# Patient Record
Sex: MALE | Race: WHITE | Employment: OTHER | URBAN - METROPOLITAN AREA
[De-identification: names, ages, dates, MRNs, and addresses within clinical notes are randomized per-mention and may not be internally consistent; named-entity substitution may affect disease eponyms.]

---

## 2024-02-16 ENCOUNTER — HOSPITAL ENCOUNTER (OUTPATIENT)
Dept: RADIOLOGY | Facility: HOSPITAL | Age: 64
Discharge: HOME | End: 2024-02-16
Payer: MEDICARE

## 2024-02-16 DIAGNOSIS — Z13.6 ENCOUNTER FOR SCREENING FOR CARDIOVASCULAR DISORDERS: ICD-10-CM

## 2024-02-16 PROCEDURE — 75571 CT HRT W/O DYE W/CA TEST: CPT

## 2024-07-08 PROBLEM — H91.93 BILATERAL HEARING LOSS: Status: ACTIVE | Noted: 2024-01-09

## 2024-07-08 PROBLEM — E78.2 MIXED HYPERLIPIDEMIA: Status: ACTIVE | Noted: 2024-01-09

## 2024-07-08 PROBLEM — N18.31 STAGE 3A CHRONIC KIDNEY DISEASE (MULTI): Status: ACTIVE | Noted: 2024-01-09

## 2024-07-08 PROBLEM — I87.2 VENOUS INSUFFICIENCY (CHRONIC) (PERIPHERAL): Status: ACTIVE | Noted: 2024-01-09

## 2024-07-08 PROBLEM — H61.22 IMPACTED CERUMEN OF LEFT EAR: Status: ACTIVE | Noted: 2024-01-09

## 2024-07-08 RX ORDER — IBUPROFEN 200 MG
200 TABLET ORAL EVERY 6 HOURS PRN
COMMUNITY

## 2024-07-08 RX ORDER — TAMSULOSIN HYDROCHLORIDE 0.4 MG/1
0.4 CAPSULE ORAL NIGHTLY
COMMUNITY
Start: 2024-01-23

## 2024-07-08 RX ORDER — AMLODIPINE BESYLATE 5 MG/1
5 TABLET ORAL DAILY
COMMUNITY
Start: 2024-01-09

## 2024-07-08 RX ORDER — LOSARTAN POTASSIUM 25 MG/1
25 TABLET ORAL DAILY
COMMUNITY
Start: 2024-01-09

## 2024-07-11 ENCOUNTER — PATIENT OUTREACH (OUTPATIENT)
Dept: HEMATOLOGY/ONCOLOGY | Facility: HOSPITAL | Age: 64
End: 2024-07-11
Payer: MEDICARE

## 2024-07-11 NOTE — PROGRESS NOTES
07/11/2024 12:15PM Patient called into [facility] for a second opinion on his prostate cancer. He was scheduled with Dr. Haley. In speaking with patient, he stated he was diagnosed by Dr. Zafar who is a surgeon, however, patient interested in hearing radiation options as well. Explained that at this point there would be no role for med/onc in newly diagnosed non-metastatic prostate cancer. He was open to seeing a radiation oncologist as he wanted to hear more about his options. Appointment with Dr. Haley cancelled and he is now scheduled to meet with Dr. Alcantar on 07/15/2024.  All of patient's information is in the system, his images have been requested to be pushed into PACS. He underwent biopsy on 04/17/2024 at [facility], path showed Stepan 7(3+4) disease, PSA at diagnosis was 28.18 drawn on 01/9/2024.  All of his information is in EPIC. Terrence Tineo RN.

## 2024-07-12 ENCOUNTER — TELEPHONE (OUTPATIENT)
Dept: RADIATION ONCOLOGY | Facility: HOSPITAL | Age: 64
End: 2024-07-12
Payer: MEDICARE

## 2024-07-12 NOTE — TELEPHONE ENCOUNTER
Called pt to remind of appointment on 7/15/24 at 1:30 Pt confirmed appointment.     Gabrielle Pittman MA

## 2024-07-15 ENCOUNTER — HOSPITAL ENCOUNTER (OUTPATIENT)
Dept: RADIATION ONCOLOGY | Facility: HOSPITAL | Age: 64
Setting detail: RADIATION/ONCOLOGY SERIES
Discharge: HOME | End: 2024-07-15
Payer: MEDICARE

## 2024-07-15 VITALS
TEMPERATURE: 97.9 F | OXYGEN SATURATION: 95 % | SYSTOLIC BLOOD PRESSURE: 144 MMHG | WEIGHT: 208.9 LBS | HEART RATE: 78 BPM | DIASTOLIC BLOOD PRESSURE: 75 MMHG | RESPIRATION RATE: 18 BRPM

## 2024-07-15 DIAGNOSIS — N18.31 STAGE 3A CHRONIC KIDNEY DISEASE (MULTI): ICD-10-CM

## 2024-07-15 DIAGNOSIS — E78.2 MIXED HYPERLIPIDEMIA: ICD-10-CM

## 2024-07-15 DIAGNOSIS — C61 PROSTATE CANCER (MULTI): Primary | ICD-10-CM

## 2024-07-15 DIAGNOSIS — I15.9 SECONDARY HYPERTENSION: ICD-10-CM

## 2024-07-15 PROCEDURE — 99205 OFFICE O/P NEW HI 60 MIN: CPT | Performed by: RADIOLOGY

## 2024-07-15 PROCEDURE — 99215 OFFICE O/P EST HI 40 MIN: CPT | Mod: GC | Performed by: RADIOLOGY

## 2024-07-15 PROCEDURE — G2211 COMPLEX E/M VISIT ADD ON: HCPCS | Performed by: RADIOLOGY

## 2024-07-15 SDOH — ECONOMIC STABILITY: FOOD INSECURITY: WITHIN THE PAST 12 MONTHS, YOU WORRIED THAT YOUR FOOD WOULD RUN OUT BEFORE YOU GOT MONEY TO BUY MORE.: NEVER TRUE

## 2024-07-15 SDOH — ECONOMIC STABILITY: FOOD INSECURITY: WITHIN THE PAST 12 MONTHS, THE FOOD YOU BOUGHT JUST DIDN'T LAST AND YOU DIDN'T HAVE MONEY TO GET MORE.: NEVER TRUE

## 2024-07-15 ASSESSMENT — ENCOUNTER SYMPTOMS
PSYCHIATRIC NEGATIVE: 1
RESPIRATORY NEGATIVE: 1
LOSS OF SENSATION IN FEET: 0
BACK PAIN: 1
GASTROINTESTINAL NEGATIVE: 1
EYES NEGATIVE: 1
CARDIOVASCULAR NEGATIVE: 1
FATIGUE: 1
NEUROLOGICAL NEGATIVE: 1
ENDOCRINE NEGATIVE: 1
OCCASIONAL FEELINGS OF UNSTEADINESS: 0
DEPRESSION: 0
HEMATOLOGIC/LYMPHATIC NEGATIVE: 1

## 2024-07-15 ASSESSMENT — PATIENT HEALTH QUESTIONNAIRE - PHQ9
2. FEELING DOWN, DEPRESSED OR HOPELESS: NOT AT ALL
SUM OF ALL RESPONSES TO PHQ9 QUESTIONS 1 AND 2: 0
1. LITTLE INTEREST OR PLEASURE IN DOING THINGS: NOT AT ALL

## 2024-07-15 ASSESSMENT — COLUMBIA-SUICIDE SEVERITY RATING SCALE - C-SSRS
2. HAVE YOU ACTUALLY HAD ANY THOUGHTS OF KILLING YOURSELF?: NO
6. HAVE YOU EVER DONE ANYTHING, STARTED TO DO ANYTHING, OR PREPARED TO DO ANYTHING TO END YOUR LIFE?: NO
1. IN THE PAST MONTH, HAVE YOU WISHED YOU WERE DEAD OR WISHED YOU COULD GO TO SLEEP AND NOT WAKE UP?: NO

## 2024-07-15 ASSESSMENT — PAIN SCALES - GENERAL: PAINLEVEL: 0-NO PAIN

## 2024-07-15 NOTE — PROGRESS NOTES
Radiation Oncology Outpatient Consult    Patient Name:  Jennifer Shelley  MRN:  [id number]  :  1960    Referring Provider: No ref. provider found  Primary Care Provider: No primary care provider on file.  Care Team: No care team member to display    Date of Service: 7/15/2024     SUBJECTIVE  History of Present Illness:  Jennifer Shelley is a 64 y.o. male for a consultation to the [facility] Department of Radiation Oncology.  He is presenting for evaluation and management of a newly diagnosed HR prostate ca (GG2, GS 3+4=7,  core, PSA 28.18 on 24). He was seen by Dr. Zafar at [facility]. Bone scan 5/3/2024 shows no suspicious foci of tracer uptake. Now present to [facility] for a consultation of treatment options. Pt reports nocturia. Denies urgency or incontinence. Denies recent body weight loss. Denies fatigue.     Prior Radiotherapy:  No radiation treatments to show. (Treatments may have been administered in another system.)       Past Medical History:    Past Medical History:   Diagnosis Date    Chronic kidney disease     stage 3    Diverticulosis     Hypertension     Prostate cancer (Multi)         Past Surgical History:    Past Surgical History:   Procedure Laterality Date    CHOLECYSTECTOMY  2024        Family History:  Cancer-related family history includes Prostate cancer in his father.    Social History:    Social History     Tobacco Use    Smoking status: Former     Current packs/day: 0.00     Types: Cigarettes     Start date:      Quit date:      Years since quittin.5    Smokeless tobacco: Never   Vaping Use    Vaping status: Never Used   Substance Use Topics    Alcohol use: Not Currently    Drug use: Not Currently       Allergies:    Allergies   Allergen Reactions    Poison Ivy Extract Unknown    Clindamycin Hives, Itching and Rash        Medications:    Current Outpatient Medications:     amLODIPine (Norvasc) 5 mg tablet, Take 1 tablet (5 mg) by mouth once  daily., Disp: , Rfl:     ibuprofen 200 mg tablet, Take 1 tablet (200 mg) by mouth every 6 hours if needed for mild pain (1 - 3) or moderate pain (4 - 6)., Disp: , Rfl:     losartan (Cozaar) 25 mg tablet, Take 1 tablet (25 mg) by mouth once daily., Disp: , Rfl:     tamsulosin (Flomax) 0.4 mg 24 hr capsule, Take 1 capsule (0.4 mg) by mouth once daily at bedtime., Disp: , Rfl:           Performance Status:  The Karnofsky performance scale today is 90, Able to carry on normal activity; minor signs or symptoms of disease (ECOG equivalent 0).        OBJECTIVE  /75   Pulse 78   Temp 36.6 °C (97.9 °F) (Skin)   Resp 18   Wt 94.8 kg (208 lb 14.4 oz)   SpO2 95%    Physical Exam  Vitals reviewed.   Constitutional:       General: He is not in acute distress.     Appearance: Normal appearance.   HENT:      Head: Normocephalic and atraumatic.      Nose: Nose normal. No rhinorrhea.      Mouth/Throat:      Mouth: Mucous membranes are moist.   Eyes:      General: No scleral icterus.     Conjunctiva/sclera: Conjunctivae normal.   Neck:      Trachea: No tracheostomy.   Cardiovascular:      Pulses: Normal pulses.      Heart sounds: Normal heart sounds.   Pulmonary:      Effort: Pulmonary effort is normal. No respiratory distress.   Abdominal:      General: Abdomen is flat.      Palpations: Abdomen is soft. There is no mass.   Musculoskeletal:         General: No deformity or signs of injury. Normal range of motion.      Cervical back: Normal range of motion.      Right lower leg: No edema.      Left lower leg: No edema.   Lymphadenopathy:      Cervical: No cervical adenopathy.   Skin:     General: Skin is warm and dry.      Coloration: Skin is not jaundiced.      Findings: No bruising.   Neurological:      General: No focal deficit present.      Mental Status: He is alert and oriented to person, place, and time.      Cranial Nerves: No cranial nerve deficit.      Coordination: Coordination normal.      Gait: Gait normal.    Psychiatric:         Mood and Affect: Mood normal.         Behavior: Behavior normal.         Thought Content: Thought content normal.         Judgment: Judgment normal.          Laboratory Review:  There are no laboratory contraindications to radiation therapy.    The pertinent lab results were reviewed and discussed with the patient.  Notably,     PSA   1/9/2024 28.18     Total prostate volume was 153gm.    Pathology Review:  The pertinent pathology results were reviewed and discussed with the patient.  Notably,   4/17/2024     FINAL DIAGNOSIS    A.  Prostate, left base, biopsy:  - Prostatic adenocarcinoma, Quincy score 3+4=7 (Grade Group 2), involving 1 of 2 cores (45%, 7.5 mm).  - Percentage of Stepan pattern 4 = 10%.     B.  Prostate, left mid, biopsy:  - Prostatic adenocarcinoma, Quincy score 3+4=7 (Grade Group 2), involving 1 of 2 cores (45%, 8 mm).  - Percentage of Stepan pattern 4 = 5%.     C.  Prostate, left apex, biopsy:  - Prostatic adenocarcinoma, Quincy score 3+4=7 (Grade Group 2), involving 1 of 2 cores (15%, 3 mm).  - Percentage of Quincy pattern 4 = 5%.     D.  Prostate, right base, biopsy:  - High-grade prostatic intraepithelial neoplasia.     E.  Prostate, right mid, biopsy:  - Benign prostatic tissue.     F.  Prostate, right apex, biopsy:  - Small focus of atypical glands (atypical small acinar proliferation).     G.  Prostate, T1 left mid posteromedial peripheral zone, biopsy:  - Prostatic adenocarcinoma, Quincy score 3+4=7 (Grade Group 2), involving 2 of 2 cores (55%, 9 mm; 25%, 4 mm).  - Percentage of Stepan pattern 4 = 10%.     Prostate Cancer Biopsy Summary     Number of cores examined: 14  Number of cores positive: 5  Highest Grade Group: 2  Highest % of core involvement: 55% (9 mm)  Unfavorable histology: Absent  Borderline histology: Present  Large cribriform pattern 4: Absent  Intraductal carcinoma: Absent  Block for additional biomarkers/molecular studies: A1        Imaging:   The pertinent imaging results were reviewed and discussed with the patient.  Notably,     3/20/2024 MRI prostate      MRI PROSTATE WO/W IVCON  Order: [id number]  Impression    IMPRESSION:    2.1 cm left mid posterior peripheral zone PI-RADS 5 lesion with suspected  extra prostatic extension and equivocal neurovascular bundle involvement.   No involvement of seminal vesicles or adjacent organs.    Enlarged inguinal lymph nodes bilaterally, one enlarged right external  iliac lymph node and several prominent but not enlarged intrapelvic lymph  nodes.  Pattern of enlarged lymph nodes would be somewhat atypical for  metastatic prostate cancer although this is possible.    Lymphoproliferative disorders also possible.    No findings suspicious for osseous metastasis.      ==========================================  Number of targets created for MR/US fusion biopsy:  Peripheral zone: 1  Transition zone: 0    If present, targets were numbered in order of level of suspicion for  clinically significant prostate cancer (Stepan score 3 + 4 or higher).    PI-RADS v2.1 Assessment Categories:  PI-RADS 1: Clinically significant cancer is highly unlikely  PI-RADS 2: Clinically significant cancer is unlikely  PI-RADS 3: Clinically significant cancer is equivocal  PI-RADS 4: Clinically significant cancer is likely  PI-RADS 5: Clinically significant cancer is highly likely                Transcribed Using Voice Recognition  Transcribe Date/Time: Mar 20 2024  3:01P    Dictated by: LALI ESPINOZA MD    This examination was interpreted and the report reviewed and  electronically signed by:  LALI ESPINOZA MD on Mar 20 2024  3:22PM  EST    5/3/2024 Bone scan     NM PET CT whole body  Order: [id number]  Impression    IMPRESSION:    No evidence of osteoblastic metastases.        Transcribed Using Voice Recognition  Transcribe Date/Time: May  3 2024  7:29P    Dictated by: MADISON SANDHU MD    This examination was interpreted and the report  reviewed and  electronically signed by:  MADISON SANDHU MD on May  3 2024  7:30PM  EST  Narrative    * * *Final Report* * *    DATE OF EXAM: May  3 2024  2:43PM      HCN   [id number]  -  NM BONE WHOLE BODY  / ACCESSION #  [id number]    PROCEDURE REASON: Prostate cancer (HCC)        * * * * Physician Interpretation * * * *    RESULT: EXAM: WHOLE BODY BONE SCAN    HISTORY: Prostate cancer (HCC)    TECHNIQUE: 24 millicuries of Tc-99m MDP administered IV.  Whole body  anterior and posterior planar images obtained 3-4 hours.  Static images  of the head and pelvis also obtained.    COMPARISON: No prior bone scan available    CORRELATION: None available    RESULTS:    Bones:  *  No suspicious foci of tracer uptake.  *  Mildly increased tracer uptake in the spine, knees, ankles and feet is  likely degenerative.    Kidneys/Bladder: Physiologic activity present.    Other findings: None.  Exam End: 05/03/24 14:43    Specimen Collected: 05/03/24 14:43 Last Resulted: 05/03/24 19:32   Received From: [facility]  Result Received: 07/10/24 07:5       ASSESSMENT:   Jennifer Shelley is a 64 y.o. male with a newly diagnosed HR prostate ca (GG2, GS 3+4=7, 5/14 core positivity, PSA 28.18, no bone mets)    We discussed about localized vs. metastatic prostate ca. The bone scan he had only shows reactive activity to bone, not prostate specific. PSMA PET CT will be a more specific way to detect metastasis from prostate ca if there is any. If there is no metastasis, he will fall into at least unfavorable intermediate-risk, and likely high-risk risk, given high PSA>10, prostate cancer based on his clinical-pathologic factors. He is not in any pain.    We discussed that treatment options include surgery, with an increased likelihood of requiring salvage RT if he has pT3b disease, or definitive external beam radiation therapy (EBRT) with 6-18 month course of hormone therapy.  We discussed various EBRT treatment regimens, including moderate  hypofractionation over 20 treatments, and ultra hypofractionation over 5 treatments. His IPSS is 8.    We discussed the logistics of radiation, including placement of fiducials into the prostate for improved and spacer gel to reduce radiation dose to the rectum.  Logistics of radiation therapy including CT-based simulation and bowel and bladder preparation were discussed, as well as risks and benefits of radiation therapy with a detailed discussion of possible short- and long-term side effects including fatigue, toxicities to the bladder, urethra, and rectum, sexual dysfunction, infertility and secondary malignancy in the radiation field.     Potential side effects from hormonal suppression therapy were discussed, which include but are not limited to sexual dysfunction, increased risk of osteoporosis and bone fractures, vasomotor 'hot flashes', weight gain and body composition changes, elevated cholesterol and lipoprotein levels, increased risk for diabetes mellitus/hyperglycemia, cardiovascular disease, fatigue, anemia, gynecomastia, cognitive decline, and decrease in penis and/or testicle size.  The severity and/or permanence of such side effects are expected to be related to the duration of suppression. We encouraged him to exercise to help offset/reduce many of these symptoms, including bone density, weight changes, and muscle mass. Educated on daily Calcium and Vitamin D supplementation.     We discussed 18m of ADT in high-risk disease. Pt will favor 6m shot instead of pills. We discussed about the side effects of hot flashes, decreased of libido, and muscle waste.      Mr. Shelley asked a number of excellent questions that demonstrated good understanding, and would like to proceed with PSMA PET CT, and decide on radiation therapy.    PLAN:  - PSMA PET CT   - Will decide on radiation therapy pending PSMA result. Pt prefers to be treated at [facility] site. Plan is SBRT+18m Lupron injection.     Josette Bah MD  PhD  PGY-2, Radiation Oncology     Agree with note and plan. Ata Alcantar MD

## 2024-07-15 NOTE — PROGRESS NOTES
Radiation Oncology Nursing Note    IPSS (International Prostate Symptom Score):   8 / 35, bother 3   - He is experiencing urinary incontinence.   Flomax   - He is not experiencing dysuria, hematuria, flank pain.     Sexual function:   - Quality of erections during the last 4 weeks: 3 = Firm enough for masturbation and foreplay only  - Use of erectile dysfunction medications:  None    Bowel function:    - Denies hematochezia or pain.    - He does not have a history of hemorrhoids.    - He does have a history of inflammatory bowel disease (Diverticulosis).    Prior Radiotherapy:  No  Radiation Treatments       No radiation treatments to show. (Treatments may have been administered in another system.)          Current Systemic Treatment:  No     Presence of Pacemaker or ICD:  No    History of Autoimmune or Connective Tissue Disorders:  No    Pain: The patient's current pain level was assessed.  They report currently having a pain of 0 out of 10.  They feel their pain is under control without the use of pain medications.    Review of Systems:  Review of Systems   Constitutional:  Positive for fatigue (recovering from recent cholecystectomy).   HENT:  Negative.     Eyes: Negative.    Respiratory: Negative.     Cardiovascular: Negative.    Gastrointestinal: Negative.    Endocrine: Negative.    Genitourinary:  Positive for nocturia (twice a night).         Urgency more than half the time, weak stream in the morning   Musculoskeletal:  Positive for back pain (siatica).   Skin: Negative.    Neurological: Negative.    Hematological: Negative.    Psychiatric/Behavioral: Negative.

## 2024-07-18 ENCOUNTER — APPOINTMENT (OUTPATIENT)
Dept: HEMATOLOGY/ONCOLOGY | Facility: CLINIC | Age: 64
End: 2024-07-18
Payer: MEDICARE

## 2024-07-30 ENCOUNTER — HOSPITAL ENCOUNTER (OUTPATIENT)
Dept: RADIOLOGY | Facility: HOSPITAL | Age: 64
Discharge: HOME | End: 2024-07-30
Payer: MEDICARE

## 2024-07-30 DIAGNOSIS — C61 PROSTATE CANCER (MULTI): ICD-10-CM

## 2024-07-30 PROCEDURE — 78815 PET IMAGE W/CT SKULL-THIGH: CPT | Mod: PI

## 2024-07-30 PROCEDURE — 78815 PET IMAGE W/CT SKULL-THIGH: CPT | Mod: PET TUMOR INIT TX STRAT | Performed by: STUDENT IN AN ORGANIZED HEALTH CARE EDUCATION/TRAINING PROGRAM

## 2024-07-30 PROCEDURE — A9800 HC RX 343 DIAGNOSTIC RADIOPHARMACEUTICALS: HCPCS | Mod: TB

## 2024-07-30 PROCEDURE — 3430000001 HC RX 343 DIAGNOSTIC RADIOPHARMACEUTICALS: Mod: TB

## 2024-10-28 ENCOUNTER — HOSPITAL ENCOUNTER (OUTPATIENT)
Dept: RADIATION ONCOLOGY | Facility: HOSPITAL | Age: 64
Setting detail: RADIATION/ONCOLOGY SERIES
Discharge: HOME | End: 2024-10-28
Payer: MEDICARE

## 2024-10-28 VITALS
RESPIRATION RATE: 18 BRPM | HEIGHT: 68 IN | DIASTOLIC BLOOD PRESSURE: 80 MMHG | BODY MASS INDEX: 32.74 KG/M2 | OXYGEN SATURATION: 95 % | TEMPERATURE: 97.7 F | WEIGHT: 216 LBS | HEART RATE: 55 BPM | SYSTOLIC BLOOD PRESSURE: 148 MMHG

## 2024-10-28 DIAGNOSIS — I87.2 VENOUS INSUFFICIENCY (CHRONIC) (PERIPHERAL): ICD-10-CM

## 2024-10-28 DIAGNOSIS — I15.9 SECONDARY HYPERTENSION: ICD-10-CM

## 2024-10-28 DIAGNOSIS — C61 PROSTATE CANCER (MULTI): Primary | ICD-10-CM

## 2024-10-28 DIAGNOSIS — C61 MALIGNANT NEOPLASM OF PROSTATE (MULTI): ICD-10-CM

## 2024-10-28 DIAGNOSIS — E78.2 MIXED HYPERLIPIDEMIA: ICD-10-CM

## 2024-10-28 DIAGNOSIS — N18.31 STAGE 3A CHRONIC KIDNEY DISEASE (MULTI): ICD-10-CM

## 2024-10-28 PROCEDURE — 99215 OFFICE O/P EST HI 40 MIN: CPT | Performed by: RADIOLOGY

## 2024-10-28 PROCEDURE — G2211 COMPLEX E/M VISIT ADD ON: HCPCS | Performed by: RADIOLOGY

## 2024-10-28 SDOH — ECONOMIC STABILITY: FOOD INSECURITY: WITHIN THE PAST 12 MONTHS, YOU WORRIED THAT YOUR FOOD WOULD RUN OUT BEFORE YOU GOT MONEY TO BUY MORE.: NEVER TRUE

## 2024-10-28 SDOH — ECONOMIC STABILITY: FOOD INSECURITY: WITHIN THE PAST 12 MONTHS, THE FOOD YOU BOUGHT JUST DIDN'T LAST AND YOU DIDN'T HAVE MONEY TO GET MORE.: NEVER TRUE

## 2024-10-28 ASSESSMENT — ENCOUNTER SYMPTOMS
DEPRESSION: 0
DIARRHEA: 1
FATIGUE: 1
PSYCHIATRIC NEGATIVE: 1
CARDIOVASCULAR NEGATIVE: 1
DIFFICULTY URINATING: 1
BACK PAIN: 1
OCCASIONAL FEELINGS OF UNSTEADINESS: 0
RESPIRATORY NEGATIVE: 1
LOSS OF SENSATION IN FEET: 0
EYES NEGATIVE: 1
NEUROLOGICAL NEGATIVE: 1
HEMATOLOGIC/LYMPHATIC NEGATIVE: 1

## 2024-10-28 ASSESSMENT — COLUMBIA-SUICIDE SEVERITY RATING SCALE - C-SSRS
2. HAVE YOU ACTUALLY HAD ANY THOUGHTS OF KILLING YOURSELF?: NO
1. IN THE PAST MONTH, HAVE YOU WISHED YOU WERE DEAD OR WISHED YOU COULD GO TO SLEEP AND NOT WAKE UP?: NO
6. HAVE YOU EVER DONE ANYTHING, STARTED TO DO ANYTHING, OR PREPARED TO DO ANYTHING TO END YOUR LIFE?: NO

## 2024-10-28 ASSESSMENT — PATIENT HEALTH QUESTIONNAIRE - PHQ9
SUM OF ALL RESPONSES TO PHQ9 QUESTIONS 1 AND 2: 0
2. FEELING DOWN, DEPRESSED OR HOPELESS: NOT AT ALL
1. LITTLE INTEREST OR PLEASURE IN DOING THINGS: NOT AT ALL

## 2024-10-28 ASSESSMENT — PAIN SCALES - GENERAL: PAINLEVEL_OUTOF10: 0-NO PAIN

## 2024-10-29 ENCOUNTER — PREP FOR PROCEDURE (OUTPATIENT)
Dept: UROLOGY | Facility: HOSPITAL | Age: 64
End: 2024-10-29
Payer: MEDICARE

## 2024-10-29 DIAGNOSIS — C61 MALIGNANT NEOPLASM OF PROSTATE (MULTI): ICD-10-CM

## 2024-10-29 RX ORDER — CEFAZOLIN SODIUM 2 G/100ML
2 INJECTION, SOLUTION INTRAVENOUS ONCE
OUTPATIENT
Start: 2024-10-29 | End: 2024-10-29

## 2024-10-31 DIAGNOSIS — C61 MALIGNANT NEOPLASM OF PROSTATE (MULTI): Primary | ICD-10-CM

## 2024-10-31 RX ORDER — DIPHENHYDRAMINE HYDROCHLORIDE 50 MG/ML
50 INJECTION INTRAMUSCULAR; INTRAVENOUS AS NEEDED
OUTPATIENT
Start: 2025-01-13

## 2024-10-31 RX ORDER — ALBUTEROL SULFATE 0.83 MG/ML
3 SOLUTION RESPIRATORY (INHALATION) AS NEEDED
OUTPATIENT
Start: 2025-01-13

## 2024-10-31 RX ORDER — EPINEPHRINE 0.3 MG/.3ML
0.3 INJECTION SUBCUTANEOUS EVERY 5 MIN PRN
OUTPATIENT
Start: 2025-01-13

## 2024-10-31 RX ORDER — FAMOTIDINE 10 MG/ML
20 INJECTION INTRAVENOUS ONCE AS NEEDED
OUTPATIENT
Start: 2025-01-13

## 2025-01-07 DIAGNOSIS — C61 PROSTATE CANCER (MULTI): Primary | ICD-10-CM

## 2025-01-13 ENCOUNTER — INFUSION (OUTPATIENT)
Dept: HEMATOLOGY/ONCOLOGY | Facility: HOSPITAL | Age: 65
End: 2025-01-13
Payer: MEDICARE

## 2025-01-13 ENCOUNTER — APPOINTMENT (OUTPATIENT)
Dept: RADIATION ONCOLOGY | Facility: HOSPITAL | Age: 65
End: 2025-01-13
Payer: MEDICARE

## 2025-01-13 VITALS
OXYGEN SATURATION: 97 % | DIASTOLIC BLOOD PRESSURE: 79 MMHG | RESPIRATION RATE: 19 BRPM | HEART RATE: 84 BPM | BODY MASS INDEX: 32.88 KG/M2 | SYSTOLIC BLOOD PRESSURE: 153 MMHG | WEIGHT: 216.27 LBS | TEMPERATURE: 98.2 F

## 2025-01-13 DIAGNOSIS — C61 MALIGNANT NEOPLASM OF PROSTATE (MULTI): ICD-10-CM

## 2025-01-13 PROCEDURE — 96402 CHEMO HORMON ANTINEOPL SQ/IM: CPT

## 2025-01-13 PROCEDURE — 2500000004 HC RX 250 GENERAL PHARMACY W/ HCPCS (ALT 636 FOR OP/ED): Mod: JZ,JG,TB

## 2025-01-13 RX ORDER — EPINEPHRINE 0.3 MG/.3ML
0.3 INJECTION SUBCUTANEOUS EVERY 5 MIN PRN
OUTPATIENT
Start: 2025-04-07

## 2025-01-13 RX ORDER — DIPHENHYDRAMINE HYDROCHLORIDE 50 MG/ML
50 INJECTION INTRAMUSCULAR; INTRAVENOUS AS NEEDED
Status: DISCONTINUED | OUTPATIENT
Start: 2025-01-13 | End: 2025-01-13 | Stop reason: HOSPADM

## 2025-01-13 RX ORDER — FAMOTIDINE 10 MG/ML
20 INJECTION INTRAVENOUS ONCE AS NEEDED
OUTPATIENT
Start: 2025-04-07

## 2025-01-13 RX ORDER — EPINEPHRINE 0.3 MG/.3ML
0.3 INJECTION SUBCUTANEOUS EVERY 5 MIN PRN
Status: DISCONTINUED | OUTPATIENT
Start: 2025-01-13 | End: 2025-01-13 | Stop reason: HOSPADM

## 2025-01-13 RX ORDER — FAMOTIDINE 10 MG/ML
20 INJECTION INTRAVENOUS ONCE AS NEEDED
Status: DISCONTINUED | OUTPATIENT
Start: 2025-01-13 | End: 2025-01-13 | Stop reason: HOSPADM

## 2025-01-13 RX ORDER — ALBUTEROL SULFATE 0.83 MG/ML
3 SOLUTION RESPIRATORY (INHALATION) AS NEEDED
Status: DISCONTINUED | OUTPATIENT
Start: 2025-01-13 | End: 2025-01-13 | Stop reason: HOSPADM

## 2025-01-13 RX ORDER — ALBUTEROL SULFATE 0.83 MG/ML
3 SOLUTION RESPIRATORY (INHALATION) AS NEEDED
OUTPATIENT
Start: 2025-04-07

## 2025-01-13 RX ORDER — DIPHENHYDRAMINE HYDROCHLORIDE 50 MG/ML
50 INJECTION INTRAMUSCULAR; INTRAVENOUS AS NEEDED
OUTPATIENT
Start: 2025-04-07

## 2025-01-13 RX ADMIN — LEUPROLIDE ACETATE 22.5 MG: KIT at 11:24

## 2025-01-13 ASSESSMENT — PAIN SCALES - GENERAL: PAINLEVEL_OUTOF10: 0-NO PAIN

## 2025-01-15 ENCOUNTER — ANESTHESIA EVENT (OUTPATIENT)
Dept: OPERATING ROOM | Facility: HOSPITAL | Age: 65
End: 2025-01-15
Payer: MEDICARE

## 2025-01-15 ENCOUNTER — ANESTHESIA (OUTPATIENT)
Dept: OPERATING ROOM | Facility: HOSPITAL | Age: 65
End: 2025-01-15
Payer: MEDICARE

## 2025-01-15 ENCOUNTER — HOSPITAL ENCOUNTER (OUTPATIENT)
Facility: HOSPITAL | Age: 65
Setting detail: OUTPATIENT SURGERY
Discharge: HOME | End: 2025-01-15
Attending: STUDENT IN AN ORGANIZED HEALTH CARE EDUCATION/TRAINING PROGRAM | Admitting: STUDENT IN AN ORGANIZED HEALTH CARE EDUCATION/TRAINING PROGRAM
Payer: MEDICARE

## 2025-01-15 VITALS
DIASTOLIC BLOOD PRESSURE: 81 MMHG | BODY MASS INDEX: 32.78 KG/M2 | TEMPERATURE: 98.6 F | SYSTOLIC BLOOD PRESSURE: 141 MMHG | HEIGHT: 68 IN | OXYGEN SATURATION: 96 % | HEART RATE: 68 BPM | RESPIRATION RATE: 19 BRPM | WEIGHT: 216.27 LBS

## 2025-01-15 DIAGNOSIS — C61 MALIGNANT NEOPLASM OF PROSTATE (MULTI): Primary | ICD-10-CM

## 2025-01-15 PROCEDURE — A55874 PR TRANSPERINEAL PLACEMENT OF BIODEGRADABLE MATERIAL, PERI-PROSTATIC, SINGLE OR MULTIPLE: Performed by: ANESTHESIOLOGIST ASSISTANT

## 2025-01-15 PROCEDURE — 3700000002 HC GENERAL ANESTHESIA TIME - EACH INCREMENTAL 1 MINUTE: Performed by: STUDENT IN AN ORGANIZED HEALTH CARE EDUCATION/TRAINING PROGRAM

## 2025-01-15 PROCEDURE — 7100000010 HC PHASE TWO TIME - EACH INCREMENTAL 1 MINUTE: Performed by: STUDENT IN AN ORGANIZED HEALTH CARE EDUCATION/TRAINING PROGRAM

## 2025-01-15 PROCEDURE — 3700000001 HC GENERAL ANESTHESIA TIME - INITIAL BASE CHARGE: Performed by: STUDENT IN AN ORGANIZED HEALTH CARE EDUCATION/TRAINING PROGRAM

## 2025-01-15 PROCEDURE — 7100000009 HC PHASE TWO TIME - INITIAL BASE CHARGE: Performed by: STUDENT IN AN ORGANIZED HEALTH CARE EDUCATION/TRAINING PROGRAM

## 2025-01-15 PROCEDURE — 7100000001 HC RECOVERY ROOM TIME - INITIAL BASE CHARGE: Performed by: STUDENT IN AN ORGANIZED HEALTH CARE EDUCATION/TRAINING PROGRAM

## 2025-01-15 PROCEDURE — 2780000003 HC OR 278 NO HCPCS: Performed by: STUDENT IN AN ORGANIZED HEALTH CARE EDUCATION/TRAINING PROGRAM

## 2025-01-15 PROCEDURE — 2500000004 HC RX 250 GENERAL PHARMACY W/ HCPCS (ALT 636 FOR OP/ED): Performed by: STUDENT IN AN ORGANIZED HEALTH CARE EDUCATION/TRAINING PROGRAM

## 2025-01-15 PROCEDURE — 55876 PLACE RT DEVICE/MARKER PROS: CPT | Performed by: STUDENT IN AN ORGANIZED HEALTH CARE EDUCATION/TRAINING PROGRAM

## 2025-01-15 PROCEDURE — 55874 TPRNL PLMT BIODEGRDABL MATRL: CPT | Performed by: STUDENT IN AN ORGANIZED HEALTH CARE EDUCATION/TRAINING PROGRAM

## 2025-01-15 PROCEDURE — 2500000004 HC RX 250 GENERAL PHARMACY W/ HCPCS (ALT 636 FOR OP/ED): Performed by: ANESTHESIOLOGIST ASSISTANT

## 2025-01-15 PROCEDURE — 2720000007 HC OR 272 NO HCPCS: Performed by: STUDENT IN AN ORGANIZED HEALTH CARE EDUCATION/TRAINING PROGRAM

## 2025-01-15 PROCEDURE — C1889 IMPLANT/INSERT DEVICE, NOC: HCPCS | Performed by: STUDENT IN AN ORGANIZED HEALTH CARE EDUCATION/TRAINING PROGRAM

## 2025-01-15 PROCEDURE — 76872 US TRANSRECTAL: CPT | Performed by: STUDENT IN AN ORGANIZED HEALTH CARE EDUCATION/TRAINING PROGRAM

## 2025-01-15 PROCEDURE — 3600000009 HC OR TIME - EACH INCREMENTAL 1 MINUTE - PROCEDURE LEVEL FOUR: Performed by: STUDENT IN AN ORGANIZED HEALTH CARE EDUCATION/TRAINING PROGRAM

## 2025-01-15 PROCEDURE — A55874 PR TRANSPERINEAL PLACEMENT OF BIODEGRADABLE MATERIAL, PERI-PROSTATIC, SINGLE OR MULTIPLE: Performed by: ANESTHESIOLOGY

## 2025-01-15 PROCEDURE — 7100000002 HC RECOVERY ROOM TIME - EACH INCREMENTAL 1 MINUTE: Performed by: STUDENT IN AN ORGANIZED HEALTH CARE EDUCATION/TRAINING PROGRAM

## 2025-01-15 PROCEDURE — 3600000004 HC OR TIME - INITIAL BASE CHARGE - PROCEDURE LEVEL FOUR: Performed by: STUDENT IN AN ORGANIZED HEALTH CARE EDUCATION/TRAINING PROGRAM

## 2025-01-15 DEVICE — STERILE PLACEMENT NEEDLES (17GA ETW X 20CM) WITH BONE WAX AND (1.2 X 3MM) SOFT TISSUE GOLD MARKER [3]
Type: IMPLANTABLE DEVICE | Site: PROSTATE | Status: FUNCTIONAL
Brand: FIDUCIAL MARKER KIT

## 2025-01-15 RX ORDER — ACETAMINOPHEN 325 MG/1
650 TABLET ORAL EVERY 4 HOURS PRN
Status: DISCONTINUED | OUTPATIENT
Start: 2025-01-15 | End: 2025-01-15 | Stop reason: HOSPADM

## 2025-01-15 RX ORDER — ONDANSETRON HYDROCHLORIDE 2 MG/ML
4 INJECTION, SOLUTION INTRAVENOUS ONCE AS NEEDED
Status: DISCONTINUED | OUTPATIENT
Start: 2025-01-15 | End: 2025-01-15 | Stop reason: HOSPADM

## 2025-01-15 RX ORDER — FENTANYL CITRATE 50 UG/ML
INJECTION, SOLUTION INTRAMUSCULAR; INTRAVENOUS AS NEEDED
Status: DISCONTINUED | OUTPATIENT
Start: 2025-01-15 | End: 2025-01-15

## 2025-01-15 RX ORDER — OXYCODONE HYDROCHLORIDE 5 MG/1
5 TABLET ORAL EVERY 4 HOURS PRN
Status: DISCONTINUED | OUTPATIENT
Start: 2025-01-15 | End: 2025-01-15 | Stop reason: HOSPADM

## 2025-01-15 RX ORDER — LIDOCAINE HYDROCHLORIDE 20 MG/ML
INJECTION, SOLUTION INFILTRATION; PERINEURAL AS NEEDED
Status: DISCONTINUED | OUTPATIENT
Start: 2025-01-15 | End: 2025-01-15

## 2025-01-15 RX ORDER — MIDAZOLAM HYDROCHLORIDE 1 MG/ML
INJECTION INTRAMUSCULAR; INTRAVENOUS AS NEEDED
Status: DISCONTINUED | OUTPATIENT
Start: 2025-01-15 | End: 2025-01-15

## 2025-01-15 RX ORDER — CEFAZOLIN SODIUM 2 G/100ML
2 INJECTION, SOLUTION INTRAVENOUS ONCE
Status: DISCONTINUED | OUTPATIENT
Start: 2025-01-15 | End: 2025-01-15 | Stop reason: HOSPADM

## 2025-01-15 RX ORDER — ALBUTEROL SULFATE 0.83 MG/ML
2.5 SOLUTION RESPIRATORY (INHALATION) ONCE AS NEEDED
Status: DISCONTINUED | OUTPATIENT
Start: 2025-01-15 | End: 2025-01-15 | Stop reason: HOSPADM

## 2025-01-15 RX ORDER — PROPOFOL 10 MG/ML
INJECTION, EMULSION INTRAVENOUS AS NEEDED
Status: DISCONTINUED | OUTPATIENT
Start: 2025-01-15 | End: 2025-01-15

## 2025-01-15 RX ORDER — CEFAZOLIN 1 G/1
INJECTION, POWDER, FOR SOLUTION INTRAVENOUS AS NEEDED
Status: DISCONTINUED | OUTPATIENT
Start: 2025-01-15 | End: 2025-01-15

## 2025-01-15 RX ADMIN — FENTANYL CITRATE 50 MCG: 50 INJECTION, SOLUTION INTRAMUSCULAR; INTRAVENOUS at 09:43

## 2025-01-15 RX ADMIN — PROPOFOL 50 MG: 10 INJECTION, EMULSION INTRAVENOUS at 09:43

## 2025-01-15 RX ADMIN — MIDAZOLAM HYDROCHLORIDE 2 MG: 1 INJECTION, SOLUTION INTRAMUSCULAR; INTRAVENOUS at 09:36

## 2025-01-15 RX ADMIN — CEFAZOLIN 2 G: 330 INJECTION, POWDER, FOR SOLUTION INTRAMUSCULAR; INTRAVENOUS at 09:44

## 2025-01-15 RX ADMIN — PROPOFOL 200 MCG/KG/MIN: 10 INJECTION, EMULSION INTRAVENOUS at 09:44

## 2025-01-15 RX ADMIN — LIDOCAINE HYDROCHLORIDE 50 MG: 20 INJECTION, SOLUTION INFILTRATION; PERINEURAL at 09:43

## 2025-01-15 ASSESSMENT — PAIN SCALES - GENERAL
PAINLEVEL_OUTOF10: 0 - NO PAIN

## 2025-01-15 ASSESSMENT — ENCOUNTER SYMPTOMS
NEUROLOGICAL NEGATIVE: 1
CARDIOVASCULAR NEGATIVE: 1
GASTROINTESTINAL NEGATIVE: 1
HEMATOLOGIC/LYMPHATIC NEGATIVE: 1
MUSCULOSKELETAL NEGATIVE: 1
CONSTITUTIONAL NEGATIVE: 1
PSYCHIATRIC NEGATIVE: 1
ENDOCRINE NEGATIVE: 1
EYES NEGATIVE: 1
RESPIRATORY NEGATIVE: 1

## 2025-01-15 ASSESSMENT — PAIN - FUNCTIONAL ASSESSMENT
PAIN_FUNCTIONAL_ASSESSMENT: UNABLE TO SELF-REPORT
PAIN_FUNCTIONAL_ASSESSMENT: 0-10
PAIN_FUNCTIONAL_ASSESSMENT: UNABLE TO SELF-REPORT

## 2025-01-15 NOTE — ANESTHESIA POSTPROCEDURE EVALUATION
Patient: Jennifer Shelley    Procedure Summary       Date: 01/15/25 Room / Location: [facility] OR 18 / Virtual [facility] OR    Anesthesia Start: 0936 Anesthesia Stop: 1002    Procedure: Insertion Fiducial Marker Prostate ~ SpaceOAR (Prostate) Diagnosis:       Malignant neoplasm of prostate (Multi)      (Malignant neoplasm of prostate (Multi) [C61])    Surgeons: Alonso Newell MD Responsible Provider: Mario Waldron MD    Anesthesia Type: MAC ASA Status: 3            Anesthesia Type: MAC    Vitals Value Taken Time   /73 01/15/25 1031   Temp 37 °C (98.6 °F) 01/15/25 0959   Pulse 73 01/15/25 1037   Resp 22 01/15/25 1037   SpO2 95 % 01/15/25 1042   Vitals shown include unfiled device data.    Anesthesia Post Evaluation    Patient location during evaluation: PACU  Patient participation: complete - patient participated  Level of consciousness: awake and alert  Pain management: adequate  Airway patency: patent  Cardiovascular status: acceptable and hemodynamically stable  Respiratory status: acceptable, spontaneous ventilation and nonlabored ventilation  Hydration status: acceptable  Postoperative Nausea and Vomiting: none        There were no known notable events for this encounter.

## 2025-01-15 NOTE — ANESTHESIA PREPROCEDURE EVALUATION
"Patient: Jennifer Shelley    Procedure Information       Date/Time: 01/15/25 0900    Procedure: Insertion Fiducial Marker Prostate ~ SpaceOAR (Prostate)    Location: [facility] OR 18 / Virtual [facility] OR    Surgeons: Alonso Newell MD            Relevant Problems   Cardiac   (+) HTN (hypertension)   (+) Mixed hyperlipidemia      /Renal   (+) Malignant neoplasm of prostate (Multi)   (+) Prostate cancer (Multi)      HEENT   (+) Bilateral hearing loss       Clinical information reviewed:    Allergies  Meds                Past Medical History:   Diagnosis Date    Chronic kidney disease     stage 3    Diverticulosis     Gout     Hypertension     Prostate cancer (Multi)       Past Surgical History:   Procedure Laterality Date    CHOLECYSTECTOMY  2024    Subtotal barbara, umbilical hernia repair     Social History     Tobacco Use    Smoking status: Former     Current packs/day: 0.00     Average packs/day: 0.5 packs/day for 15.0 years (7.5 ttl pk-yrs)     Types: Cigarettes     Start date: 1976     Quit date:      Years since quittin.0    Smokeless tobacco: Never   Vaping Use    Vaping status: Never Used   Substance Use Topics    Alcohol use: Yes     Alcohol/week: 4.0 standard drinks of alcohol     Types: 2 Cans of beer, 2 Shots of liquor per week    Drug use: Not Currently      Current Outpatient Medications   Medication Instructions    amLODIPine (NORVASC) 5 mg, Daily    ibuprofen 200 mg, Every 6 hours PRN    losartan (COZAAR) 25 mg, Daily    tamsulosin (FLOMAX) 0.4 mg, Nightly      Allergies   Allergen Reactions    Poison Ivy Extract Unknown    Clindamycin Hives, Itching and Rash        Chemistry    No results found for: \"NA\", \"K\", \"CL\", \"CO2\", \"BUN\", \"CREATININE\", \"GLU\" No results found for: \"CALCIUM\", \"ALKPHOS\", \"AST\", \"ALT\", \"BILITOT\"       Lab Results   Component Value Date    HGBA1C 5.7 (H) 2024     No results found for: \"WBC\", \"HGB\", \"HCT\", \"PLT\"  No results found for: \"PROTIME\", \"PTT\", \"INR\"  No " "results found for: \"ABORH\"  No results found for this or any previous visit (from the past 4464 hours).  No results found for this or any previous visit from the past 1095 days.       Visit Vitals  /85   Pulse 71   Temp 36.3 °C (97.3 °F) (Temporal)   Resp 18   Ht 1.727 m (5' 8\")   Wt 98.1 kg (216 lb 4.3 oz)   SpO2 98%   BMI 32.88 kg/m²   Smoking Status Former   BSA 2.17 m²     NPO/Void Status  Carbohydrate Drink Given Prior to Surgery? : N  Date of Last Liquid: 01/15/25  Time of Last Liquid: 0700 (sip of water with meds)  Date of Last Solid: 01/14/25  Time of Last Solid: 2100  Last Intake Type: Clear fluids  Time of Last Void: 0737        Physical Exam    Airway  Mallampati: III  TM distance: >3 FB  Neck ROM: full     Cardiovascular - normal exam  Rhythm: regular  Rate: normal     Dental    Pulmonary - normal exam     Abdominal - normal exam       Other findings: Large beard, poor dentition but no loose teeth          Anesthesia Plan    History of general anesthesia?: yes  History of complications of general anesthesia?: no    ASA 3     MAC   (Standard ASA monitoring.)  intravenous induction   Anesthetic plan and risks discussed with patient.    Plan discussed with CRNA and CAA.        "

## 2025-01-15 NOTE — H&P
History Of Present Illness  Jennifer Shelley is a 64 y.o. male presenting with history of high-risk prostate cancer referred by Dr. Alcantar.     Past Medical History  Past Medical History:   Diagnosis Date    Chronic kidney disease     stage 3    Diverticulosis     Gout     Hypertension     Prostate cancer (Multi)        Surgical History  Past Surgical History:   Procedure Laterality Date    CHOLECYSTECTOMY  05/29/2024    Subtotal barbara, umbilical hernia repair        Social History  He reports that he quit smoking about 32 years ago. His smoking use included cigarettes. He started smoking about 49 years ago. He has a 7.5 pack-year smoking history. He has never used smokeless tobacco. He reports current alcohol use of about 4.0 standard drinks of alcohol per week. He reports that he does not currently use drugs.    Family History  Family History   Problem Relation Name Age of Onset    Prostate cancer Father          Allergies  Poison ivy extract and Clindamycin    Review of Systems   Constitutional: Negative.    HENT: Negative.     Eyes: Negative.    Respiratory: Negative.     Cardiovascular: Negative.    Gastrointestinal: Negative.    Endocrine: Negative.    Genitourinary: Negative.    Musculoskeletal: Negative.    Neurological: Negative.    Hematological: Negative.    Psychiatric/Behavioral: Negative.     All other systems reviewed and are negative.       Physical Exam  Vitals reviewed.   Constitutional:       Appearance: Normal appearance.   HENT:      Head: Normocephalic and atraumatic.      Mouth/Throat:      Mouth: Mucous membranes are moist.      Pharynx: Oropharynx is clear.   Eyes:      Extraocular Movements: Extraocular movements intact.      Pupils: Pupils are equal, round, and reactive to light.   Cardiovascular:      Rate and Rhythm: Normal rate and regular rhythm.   Pulmonary:      Effort: Pulmonary effort is normal.      Breath sounds: Normal breath sounds.   Abdominal:      General: There is no  "distension.      Palpations: Abdomen is soft.      Tenderness: There is no abdominal tenderness.   Musculoskeletal:         General: Normal range of motion.      Cervical back: Normal range of motion and neck supple.   Skin:     General: Skin is warm and dry.   Neurological:      General: No focal deficit present.      Mental Status: He is alert and oriented to person, place, and time.   Psychiatric:         Mood and Affect: Mood normal.         Behavior: Behavior normal.          Last Recorded Vitals  Blood pressure 145/85, pulse 71, temperature 36.3 °C (97.3 °F), temperature source Temporal, resp. rate 18, height 1.727 m (5' 8\"), weight 98.1 kg (216 lb 4.3 oz), SpO2 98%.    Relevant Results         Assessment/Plan   Assessment & Plan  Malignant neoplasm of prostate (Multi)      Consent obtained proceed to OR    Alonso Newell MD    "

## 2025-01-15 NOTE — OP NOTE
Insertion Fiducial Marker Prostate ~ SpaceOAR Operative Note     Date: 1/15/2025  OR Location: [facility]    Name: Jennifer Shelley, : 1960, Age: 64 y.o., MRN: [id number], Sex: male    Diagnosis  Pre-op Diagnosis      * Malignant neoplasm of prostate (Multi) [C61] Post-op Diagnosis     * Malignant neoplasm of prostate (Multi) [C61]     Procedures  Insertion Fiducial Marker Prostate ~ SpaceOAR  46121 - MS PLMT INTERSTITIAL DEV RADIAT TX PROSTATE 1/MULT    MS TRANSPERINEAL PLMT BIODEGRADABLE MATRL 1/MLT NJX [09296]  Surgeons      * Alonso Newell - Kary    Resident/Fellow/Other Assistant:  Surgeons and Role:  * No surgeons found with a matching role *    Staff:   Nataliulator: Chadd Spencer Person: Shonda  Circulator: Rossana    Anesthesia Staff: Anesthesiologist: Mario Waldron MD  C-AA: Sunny Hood, ELLIOT    Procedure Summary  Anesthesia: Monitor Anesthesia Care  ASA: III  Estimated Blood Loss: 2 mL  Intra-op Medications:   Administrations occurring from 0900 to 0940 on 01/15/25:   Medication Name Total Dose   midazolam PF (Versed) injection 1 mg/mL 2 mg              Anesthesia Record               Intraprocedure I/O Totals          Intake    Propofol Drip 0.00 mL    The total shown is the total volume documented since Anesthesia Start was filed.    Total Intake 0 mL          Specimen: No specimens collected              Drains and/or Catheters: * None in log *    Tourniquet Times:         Implants:  Implants       Type Name Action Serial No.      Implant GOLD MARKERS, 1.2MM, SOFT TISSUE, 20CM 17GA NEEDLES, 3-PK, STRL - SNA - [id number] Implanted NA            Indications: Jennifer Shelley is an 64 y.o. male who is having surgery for Malignant neoplasm of prostate (Multi) [C61].     The patient was seen in the preoperative area. The risks, benefits, complications, treatment options, non-operative alternatives, expected recovery and outcomes were discussed with the patient. The possibilities of reaction to medication, pulmonary  aspiration, injury to surrounding structures, bleeding, recurrent infection, the need for additional procedures, failure to diagnose a condition, and creating a complication requiring transfusion or operation were discussed with the patient. The patient concurred with the proposed plan, giving informed consent.  The site of surgery was properly noted/marked if necessary per policy. The patient has been actively warmed in preoperative area. Preoperative antibiotics have been ordered and given within 1 hours of incision. Venous thrombosis prophylaxis have been ordered including bilateral sequential compression devices    Procedure Details:          Preoperative Diagnosis  Prostate cancer.       Postoperative Diagnosis  Same.       Procedure Performed  SpaceOAR and Fiducial placement, Transrectal ultrasound .   Surgeon  Alonso Newell MD, M.S.   Assisted by  N/a      Details of Procedure     Indication for procedure: This is a 64-year-old gentleman with a recent diagnosis of prostate cancer. A  course of radiation therapy has been prescribed. To reduce rectal irradiation during  radiation therapy, patient presents for injection of an absorbable polyethylene glycol (PEG) hydrogel (SpaceOAR).     Anesthesia  MAC.   Estimated Blood Loss (ml)  minimal   Specimen(s) Removed  None.   Drain(s)  None.   Implant(s)  None.   Complications  None.   Indications (History)  Prostate cancer.   Findings of Procedure  Successful placement of fiducial markers (3) and Spaceoar hydrogel   Description of Procedure  After administration of anesthesia and antibiotics, the patient was placed in the dorsal lithotomy position and prepped in the usual sterile fashion. A bilateral pudendal nerve block was performed using standard technique (1% lidocaine solution) The needle was advanced to the mid perineum region and an adequate dose of Lidocaine was injected. Once this area became anesthetized (~5 min), the needle was advanced until it was proximal  to the pudendal nerve, and an additional dose of Lidocaine was injected.    Fiducial markers were placed at the base, apex and mid prostate. SpaceOAR hydrogel was prepared as described in the ’s Instructions For Use while the patient was prepped. With the subject maintained in the dorsal lithotomy position, the transrectal ultrasound (TRUS) probe was positioned to enable visual guidance of the needle into the space between the prostate and the rectum. Under transrectal ultrasound guidance, the 15 cm 18G needle was inserted through the rectourethralis muscle and the needle tip advanced into the perirectal fat inferior to the prostate all by using a transperineal approach and with side-fire transrectal ultrasound guidance. The needle position was confirmed in both sagittal and axial fields. Saline was used to dissect the space between the Denonvilliers’ fascia and anterior rectal wall (“hydrodissection”). A space was created with hydrodissection.    With the needle tip at mid gland, the axial field was viewed to confirm the needle was not in the rectal wall (movement of the needle tip without corresponding movement of the rectal wall will confirm perirectal placement). While maintaining the desired position, aspiration was done to ensure that the needle was not in vascular space. The assembled SpaceOAR delivery system was then attached to the 18G needle. Under ultrasound guidance (sagittal plane), a smooth, continuous injection technique was used to dispense the SpaceOAR hydrogel into the space between the prostate and rectum (Denonvilliers’ fascia and the anterior rectal wall). The entire syringe contents (10 mL total) were injected without stopping. Optimal visualization of the needle during hydrogel administration was maintained at all times.    No suspected penetration or compromise of the rectal wall occurred.       Complications:  None; patient tolerated the procedure well.    Disposition: PACU -  hemodynamically stable.  Condition: stable       Attending Attestation: I was present and scrubbed for the entire procedure.    Alonso Newell  Phone Number: [phone]

## 2025-01-15 NOTE — PERIOPERATIVE NURSING NOTE
0959 - Patient to bay with anesthesia present, VSS. Plan of care reviewed. Patient family updated via text message.  1010 - Patient alert and awake, tolerating PO water.  1015 - Patient visitor updated via phone call.  Patient tolerating gold fish crackers.  1045 - Phase II at this time.  Discharge instructions reviewed with patient and patient visitor, via phone call.  No further questions at this time from patient or patient visitor at this time.  1050 - Patient dressed with assistance from RN without complication. IV removed by RN with catheter tip intact.  [facility] with discharge folder and all belongings.

## 2025-01-22 ENCOUNTER — HOSPITAL ENCOUNTER (OUTPATIENT)
Dept: RADIOLOGY | Facility: EXTERNAL LOCATION | Age: 65
Discharge: HOME | End: 2025-01-22

## 2025-01-22 ENCOUNTER — HOSPITAL ENCOUNTER (OUTPATIENT)
Dept: RADIATION ONCOLOGY | Facility: HOSPITAL | Age: 65
Setting detail: RADIATION/ONCOLOGY SERIES
Discharge: HOME | End: 2025-01-22
Payer: MEDICARE

## 2025-01-22 DIAGNOSIS — C61 MALIGNANT NEOPLASM OF PROSTATE (MULTI): ICD-10-CM

## 2025-01-22 DIAGNOSIS — C61 MALIGNANT NEOPLASM OF PROSTATE (MULTI): Primary | ICD-10-CM

## 2025-01-22 PROCEDURE — 77334 RADIATION TREATMENT AID(S): CPT | Performed by: STUDENT IN AN ORGANIZED HEALTH CARE EDUCATION/TRAINING PROGRAM

## 2025-01-22 PROCEDURE — 77290 THER RAD SIMULAJ FIELD CPLX: CPT | Performed by: STUDENT IN AN ORGANIZED HEALTH CARE EDUCATION/TRAINING PROGRAM

## 2025-01-30 ENCOUNTER — HOSPITAL ENCOUNTER (OUTPATIENT)
Dept: RADIATION ONCOLOGY | Facility: HOSPITAL | Age: 65
Setting detail: RADIATION/ONCOLOGY SERIES
Discharge: HOME | End: 2025-01-30
Payer: MEDICARE

## 2025-01-30 PROCEDURE — 77334 RADIATION TREATMENT AID(S): CPT | Performed by: RADIOLOGY

## 2025-01-30 PROCEDURE — 77295 3-D RADIOTHERAPY PLAN: CPT | Performed by: RADIOLOGY

## 2025-01-30 PROCEDURE — 77300 RADIATION THERAPY DOSE PLAN: CPT | Performed by: RADIOLOGY

## 2025-01-30 PROCEDURE — 77370 RADIATION PHYSICS CONSULT: CPT | Mod: 59 | Performed by: RADIOLOGY

## 2025-02-05 ENCOUNTER — HOSPITAL ENCOUNTER (OUTPATIENT)
Dept: RADIATION ONCOLOGY | Facility: HOSPITAL | Age: 65
Setting detail: RADIATION/ONCOLOGY SERIES
Discharge: HOME | End: 2025-02-05
Payer: MEDICARE

## 2025-02-05 DIAGNOSIS — C61 MALIGNANT NEOPLASM OF PROSTATE (MULTI): ICD-10-CM

## 2025-02-05 DIAGNOSIS — Z51.0 ENCOUNTER FOR ANTINEOPLASTIC RADIATION THERAPY: ICD-10-CM

## 2025-02-05 LAB
RAD ONC MSQ ACTUAL FRACTIONS DELIVERED: 1
RAD ONC MSQ ACTUAL SESSION DELIVERED DOSE: 840 CGRAY
RAD ONC MSQ ACTUAL TOTAL DOSE: 840 CGRAY
RAD ONC MSQ ELAPSED DAYS: 0
RAD ONC MSQ LAST DATE: NORMAL
RAD ONC MSQ PRESCRIBED FRACTIONAL DOSE: 840 CGRAY
RAD ONC MSQ PRESCRIBED NUMBER OF FRACTIONS: 5
RAD ONC MSQ PRESCRIBED TECHNIQUE: NORMAL
RAD ONC MSQ PRESCRIBED TOTAL DOSE: 4200 CGRAY
RAD ONC MSQ PRESCRIPTION PATTERN COMMENT: NORMAL
RAD ONC MSQ START DATE: NORMAL
RAD ONC MSQ TREATMENT COURSE NUMBER: 1
RAD ONC MSQ TREATMENT SITE: NORMAL

## 2025-02-05 PROCEDURE — 77336 RADIATION PHYSICS CONSULT: CPT | Mod: 59 | Performed by: RADIOLOGY

## 2025-02-05 PROCEDURE — 77280 THER RAD SIMULAJ FIELD SMPL: CPT | Performed by: STUDENT IN AN ORGANIZED HEALTH CARE EDUCATION/TRAINING PROGRAM

## 2025-02-05 PROCEDURE — 77373 STRTCTC BDY RAD THER TX DLVR: CPT | Performed by: STUDENT IN AN ORGANIZED HEALTH CARE EDUCATION/TRAINING PROGRAM

## 2025-02-07 ENCOUNTER — HOSPITAL ENCOUNTER (OUTPATIENT)
Dept: RADIATION ONCOLOGY | Facility: HOSPITAL | Age: 65
Setting detail: RADIATION/ONCOLOGY SERIES
Discharge: HOME | End: 2025-02-07
Payer: MEDICARE

## 2025-02-07 DIAGNOSIS — C61 MALIGNANT NEOPLASM OF PROSTATE (MULTI): ICD-10-CM

## 2025-02-07 DIAGNOSIS — Z51.0 ENCOUNTER FOR ANTINEOPLASTIC RADIATION THERAPY: ICD-10-CM

## 2025-02-07 LAB
RAD ONC MSQ ACTUAL FRACTIONS DELIVERED: 2
RAD ONC MSQ ACTUAL SESSION DELIVERED DOSE: 840 CGRAY
RAD ONC MSQ ACTUAL TOTAL DOSE: 1680 CGRAY
RAD ONC MSQ ELAPSED DAYS: 2
RAD ONC MSQ LAST DATE: NORMAL
RAD ONC MSQ PRESCRIBED FRACTIONAL DOSE: 840 CGRAY
RAD ONC MSQ PRESCRIBED NUMBER OF FRACTIONS: 5
RAD ONC MSQ PRESCRIBED TECHNIQUE: NORMAL
RAD ONC MSQ PRESCRIBED TOTAL DOSE: 4200 CGRAY
RAD ONC MSQ PRESCRIPTION PATTERN COMMENT: NORMAL
RAD ONC MSQ START DATE: NORMAL
RAD ONC MSQ TREATMENT COURSE NUMBER: 1
RAD ONC MSQ TREATMENT SITE: NORMAL

## 2025-02-07 PROCEDURE — 77373 STRTCTC BDY RAD THER TX DLVR: CPT | Performed by: STUDENT IN AN ORGANIZED HEALTH CARE EDUCATION/TRAINING PROGRAM

## 2025-02-10 ENCOUNTER — HOSPITAL ENCOUNTER (OUTPATIENT)
Dept: RADIATION ONCOLOGY | Facility: HOSPITAL | Age: 65
Setting detail: RADIATION/ONCOLOGY SERIES
Discharge: HOME | End: 2025-02-10
Payer: MEDICARE

## 2025-02-10 VITALS
OXYGEN SATURATION: 96 % | TEMPERATURE: 98.2 F | HEIGHT: 68 IN | SYSTOLIC BLOOD PRESSURE: 165 MMHG | HEART RATE: 90 BPM | BODY MASS INDEX: 32.98 KG/M2 | RESPIRATION RATE: 18 BRPM | DIASTOLIC BLOOD PRESSURE: 77 MMHG | WEIGHT: 217.6 LBS

## 2025-02-10 DIAGNOSIS — Z51.0 ENCOUNTER FOR ANTINEOPLASTIC RADIATION THERAPY: ICD-10-CM

## 2025-02-10 DIAGNOSIS — C61 MALIGNANT NEOPLASM OF PROSTATE (MULTI): ICD-10-CM

## 2025-02-10 LAB
RAD ONC MSQ ACTUAL FRACTIONS DELIVERED: 3
RAD ONC MSQ ACTUAL SESSION DELIVERED DOSE: 840 CGRAY
RAD ONC MSQ ACTUAL TOTAL DOSE: 2520 CGRAY
RAD ONC MSQ ELAPSED DAYS: 5
RAD ONC MSQ LAST DATE: NORMAL
RAD ONC MSQ PRESCRIBED FRACTIONAL DOSE: 840 CGRAY
RAD ONC MSQ PRESCRIBED NUMBER OF FRACTIONS: 5
RAD ONC MSQ PRESCRIBED TECHNIQUE: NORMAL
RAD ONC MSQ PRESCRIBED TOTAL DOSE: 4200 CGRAY
RAD ONC MSQ PRESCRIPTION PATTERN COMMENT: NORMAL
RAD ONC MSQ START DATE: NORMAL
RAD ONC MSQ TREATMENT COURSE NUMBER: 1
RAD ONC MSQ TREATMENT SITE: NORMAL

## 2025-02-10 PROCEDURE — 77373 STRTCTC BDY RAD THER TX DLVR: CPT | Performed by: STUDENT IN AN ORGANIZED HEALTH CARE EDUCATION/TRAINING PROGRAM

## 2025-02-10 ASSESSMENT — PAIN SCALES - GENERAL: PAINLEVEL_OUTOF10: 0-NO PAIN

## 2025-02-12 ENCOUNTER — HOSPITAL ENCOUNTER (OUTPATIENT)
Dept: RADIATION ONCOLOGY | Facility: HOSPITAL | Age: 65
Setting detail: RADIATION/ONCOLOGY SERIES
Discharge: HOME | End: 2025-02-12
Payer: MEDICARE

## 2025-02-12 DIAGNOSIS — C61 MALIGNANT NEOPLASM OF PROSTATE (MULTI): ICD-10-CM

## 2025-02-12 DIAGNOSIS — Z51.0 ENCOUNTER FOR ANTINEOPLASTIC RADIATION THERAPY: ICD-10-CM

## 2025-02-12 LAB
RAD ONC MSQ ACTUAL FRACTIONS DELIVERED: 4
RAD ONC MSQ ACTUAL SESSION DELIVERED DOSE: 840 CGRAY
RAD ONC MSQ ACTUAL TOTAL DOSE: 3360 CGRAY
RAD ONC MSQ ELAPSED DAYS: 7
RAD ONC MSQ LAST DATE: NORMAL
RAD ONC MSQ PRESCRIBED FRACTIONAL DOSE: 840 CGRAY
RAD ONC MSQ PRESCRIBED NUMBER OF FRACTIONS: 5
RAD ONC MSQ PRESCRIBED TECHNIQUE: NORMAL
RAD ONC MSQ PRESCRIBED TOTAL DOSE: 4200 CGRAY
RAD ONC MSQ PRESCRIPTION PATTERN COMMENT: NORMAL
RAD ONC MSQ START DATE: NORMAL
RAD ONC MSQ TREATMENT COURSE NUMBER: 1
RAD ONC MSQ TREATMENT SITE: NORMAL

## 2025-02-12 PROCEDURE — 77373 STRTCTC BDY RAD THER TX DLVR: CPT | Performed by: STUDENT IN AN ORGANIZED HEALTH CARE EDUCATION/TRAINING PROGRAM

## 2025-02-14 ENCOUNTER — HOSPITAL ENCOUNTER (OUTPATIENT)
Dept: RADIATION ONCOLOGY | Facility: HOSPITAL | Age: 65
Setting detail: RADIATION/ONCOLOGY SERIES
Discharge: HOME | End: 2025-02-14
Payer: MEDICARE

## 2025-02-14 ENCOUNTER — DOCUMENTATION (OUTPATIENT)
Dept: RADIATION ONCOLOGY | Facility: HOSPITAL | Age: 65
End: 2025-02-14
Payer: MEDICARE

## 2025-02-14 DIAGNOSIS — C61 MALIGNANT NEOPLASM OF PROSTATE (MULTI): Primary | ICD-10-CM

## 2025-02-14 DIAGNOSIS — Z51.0 ENCOUNTER FOR ANTINEOPLASTIC RADIATION THERAPY: ICD-10-CM

## 2025-02-14 DIAGNOSIS — C61 MALIGNANT NEOPLASM OF PROSTATE (MULTI): ICD-10-CM

## 2025-02-14 LAB
RAD ONC MSQ ACTUAL FRACTIONS DELIVERED: 5
RAD ONC MSQ ACTUAL SESSION DELIVERED DOSE: 840 CGRAY
RAD ONC MSQ ACTUAL TOTAL DOSE: 4200 CGRAY
RAD ONC MSQ ELAPSED DAYS: 9
RAD ONC MSQ LAST DATE: NORMAL
RAD ONC MSQ PRESCRIBED FRACTIONAL DOSE: 840 CGRAY
RAD ONC MSQ PRESCRIBED NUMBER OF FRACTIONS: 5
RAD ONC MSQ PRESCRIBED TECHNIQUE: NORMAL
RAD ONC MSQ PRESCRIBED TOTAL DOSE: 4200 CGRAY
RAD ONC MSQ PRESCRIPTION PATTERN COMMENT: NORMAL
RAD ONC MSQ START DATE: NORMAL
RAD ONC MSQ TREATMENT COURSE NUMBER: 1
RAD ONC MSQ TREATMENT SITE: NORMAL

## 2025-02-14 PROCEDURE — 77373 STRTCTC BDY RAD THER TX DLVR: CPT | Performed by: RADIOLOGY

## 2025-03-07 NOTE — PROGRESS NOTES
Radiation Oncology Treatment Summary    Patient Name:  Jennifer Shelley  MRN:  [id number]  :  1960    Referring Provider: No ref. provider found  Primary Care Provider: No Assigned PCP Generic Provider, MD    Brief History: Jennifer Shelley is a 65 y.o. male with No matching staging information was found for the patient..  The patient completed radiotherapy as outlined below.    Radiation Treatment Summary:    SBRT: Not Applicable Prostate    Treatment Period Technique Fraction Dose Fractions Total Dose   Course 1 2025-2025  (days elapsed: 9)         Prost_SV 2025-2025 SBRT 840 / 840 cGy  4200 / 4,200 cGy       Please see the patient's Mos[facility] chart for further details regarding the radiation plan, including beam energy.    Concurrent Chemotherapy:  Treatment Plans       No treatment plans exist          CTCAE Toxicity Overview:   Toxicity Assessment          2/10/2025    15:43   Toxicity Assessment   Adverse Events Reviewed (WDL) No (Exceptions to WDL)   Treatment Site Pelvis - male   Anorexia Grade 0   Dermatitis Radiation Grade 0   Fatigue Grade 1       resolves with rest   Nausea Grade 0   Pain Grade 0   Vomiting Grade 0   Constipation Grade 0   Proctitis Grade 0   Rectal Pain Grade 0   Hematuria Grade 0   Urinary Incontinence Grade 1       dribbling occasionally, started friday   Bladder Spasm Grade 2       pt reports moderate, started friday   Urinary Frequency Grade 1       Minimal amounts, getting up 3x/night to urinate (increased from 1x night) started friday   Urinary Retention Grade 0   Urinary Tract Obstruction Grade 0   Urinary Tract Pain Grade 2       discomfort, every urination; started friday   Urinary Urgency Grade 1       increased since friday     Patient Disposition:

## 2025-04-07 ENCOUNTER — INFUSION (OUTPATIENT)
Dept: HEMATOLOGY/ONCOLOGY | Facility: HOSPITAL | Age: 65
End: 2025-04-07
Payer: MEDICARE

## 2025-04-07 VITALS
SYSTOLIC BLOOD PRESSURE: 138 MMHG | TEMPERATURE: 97.5 F | DIASTOLIC BLOOD PRESSURE: 64 MMHG | RESPIRATION RATE: 18 BRPM | WEIGHT: 225 LBS | BODY MASS INDEX: 34.21 KG/M2 | OXYGEN SATURATION: 99 % | HEART RATE: 83 BPM

## 2025-04-07 DIAGNOSIS — C61 MALIGNANT NEOPLASM OF PROSTATE (MULTI): ICD-10-CM

## 2025-04-07 PROCEDURE — 2500000004 HC RX 250 GENERAL PHARMACY W/ HCPCS (ALT 636 FOR OP/ED): Mod: JZ,TB

## 2025-04-07 PROCEDURE — 96402 CHEMO HORMON ANTINEOPL SQ/IM: CPT

## 2025-04-07 RX ORDER — FAMOTIDINE 10 MG/ML
20 INJECTION, SOLUTION INTRAVENOUS ONCE AS NEEDED
Status: DISCONTINUED | OUTPATIENT
Start: 2025-04-07 | End: 2025-04-07 | Stop reason: HOSPADM

## 2025-04-07 RX ORDER — DIPHENHYDRAMINE HYDROCHLORIDE 50 MG/ML
50 INJECTION, SOLUTION INTRAMUSCULAR; INTRAVENOUS AS NEEDED
OUTPATIENT
Start: 2025-06-30

## 2025-04-07 RX ORDER — DIPHENHYDRAMINE HYDROCHLORIDE 50 MG/ML
50 INJECTION, SOLUTION INTRAMUSCULAR; INTRAVENOUS AS NEEDED
Status: DISCONTINUED | OUTPATIENT
Start: 2025-04-07 | End: 2025-04-07 | Stop reason: HOSPADM

## 2025-04-07 RX ORDER — ALBUTEROL SULFATE 0.83 MG/ML
3 SOLUTION RESPIRATORY (INHALATION) AS NEEDED
Status: DISCONTINUED | OUTPATIENT
Start: 2025-04-07 | End: 2025-04-07 | Stop reason: HOSPADM

## 2025-04-07 RX ORDER — EPINEPHRINE 0.3 MG/.3ML
0.3 INJECTION SUBCUTANEOUS EVERY 5 MIN PRN
OUTPATIENT
Start: 2025-06-30

## 2025-04-07 RX ORDER — ALBUTEROL SULFATE 0.83 MG/ML
3 SOLUTION RESPIRATORY (INHALATION) AS NEEDED
OUTPATIENT
Start: 2025-06-30

## 2025-04-07 RX ORDER — EPINEPHRINE 0.3 MG/.3ML
0.3 INJECTION SUBCUTANEOUS EVERY 5 MIN PRN
Status: DISCONTINUED | OUTPATIENT
Start: 2025-04-07 | End: 2025-04-07 | Stop reason: HOSPADM

## 2025-04-07 RX ORDER — FAMOTIDINE 10 MG/ML
20 INJECTION, SOLUTION INTRAVENOUS ONCE AS NEEDED
OUTPATIENT
Start: 2025-06-30

## 2025-04-07 RX ADMIN — LEUPROLIDE ACETATE 22.5 MG: KIT at 16:01

## 2025-04-07 ASSESSMENT — PAIN SCALES - GENERAL: PAINLEVEL_OUTOF10: 0-NO PAIN

## 2025-04-07 NOTE — PROGRESS NOTES
Jennifer Shelley arrived to infusion center for Lupron Depot injection. Pt received injection without issues. Pt discharged home in stable condition

## 2025-06-16 ENCOUNTER — TELEPHONE (OUTPATIENT)
Dept: RADIATION ONCOLOGY | Facility: HOSPITAL | Age: 65
End: 2025-06-16
Payer: MEDICARE

## 2025-06-16 NOTE — PROGRESS NOTES
Cancer synopsis:  Rad/onc: Dr. Alcantar/Daniela JORGENSEN     64 y/o male w/ high risk prostate ca (GG2, GS 3+4=7, 5/14 core, PSA 28.18 on 1/9/24).     Prostate ca hx:  He was seen by Dr. Zafar at [facility]. Bone scan 5/3/2024 shows no suspicious foci of tracer uptake.      01/13/2025: Started q3m adt plan for 18-24m    SBRT to prostate and SV 02/10/2025    History of presenting illness:    Patient ID: [id number]     Jennifer Shelley is a 65 y.o. male who presents for high risk prostate ca (GG2, GS 3+4=7, 5/14 core, PSA 28.18) now s/p SBRT and on lng term adt w/ rad/onc.    RT Site: prostate and SV  RT Date: 02/10/2025  Hormone therapy: Yes adt lng term   Hot Flushes: Admits to hot flashes that are overall tolerable  Fatigue: Denies  Bone pain: Denies  ED: Reports loss of drive and function with injections  - Quality of erections during the last 4 weeks: 1 = None at all  - Use of erectile dysfunction medications:  None  IPSS: did not complete  Urinary symptoms: Developed dysuria grade 2 during tx and grade 1 urinary frequency (3 times a night nocturia) has since resolved no longer having pain and nocturia down to once. Reports urgency when sitting with a few drops of leakage at times.  Urinary Medications: Yes flowmax daily  Rectal bleeding: Denies  Colonoscopy: 02/2024 cologaurd negative  Other systems: Denies SOB, CP or fever    Review of systems:  Review of Systems   Constitutional:  Negative for fatigue, fever and unexpected weight change.   Respiratory:  Negative for cough, chest tightness and shortness of breath.    Cardiovascular:  Negative for chest pain, palpitations and leg swelling.   Gastrointestinal:  Negative for abdominal pain, anal bleeding, blood in stool, constipation, diarrhea and rectal pain.   Endocrine: Negative for cold intolerance, heat intolerance and polyuria.   Genitourinary:  Negative for decreased urine volume, difficulty urinating, dysuria, frequency, hematuria and urgency.   Musculoskeletal:   "Negative for arthralgias, back pain, gait problem and joint swelling.   Skin: Negative.    Allergic/Immunologic: Negative.    Neurological:  Negative for dizziness, syncope and weakness.   Psychiatric/Behavioral: Negative.       PERFORMANCE STATUS:  KPS/ECO, Fully active, able to carry on all pre-disease performed without restriction (ECOG equivalent 0)    Past Medical history  Medical History[1]     Surgical/family history  Family History[2]   Surgical History[3]     Social History  Tobacco Use: Medium Risk (2025)    Received from [facility]    Patient History     Smoking Tobacco Use: Former     Smokeless Tobacco Use: Never     Passive Exposure: Not on file       Current med list:  Current Outpatient Medications   Medication Instructions    amLODIPine (NORVASC) 5 mg, Daily    ibuprofen 200 mg, Every 6 hours PRN    losartan (COZAAR) 25 mg, Daily    tamsulosin (FLOMAX) 0.4 mg, oral, Nightly      Last recorded vital:  /76   Pulse 80   Temp 36.2 °C (97.2 °F) (Temporal)   Resp 18   Wt 105 kg (232 lb 4.8 oz)   SpO2 96%   BMI 35.32 kg/m²     Physical Exam  Constitutional:       Appearance: Normal appearance.   Cardiovascular:      Rate and Rhythm: Normal rate.   Pulmonary:      Effort: Pulmonary effort is normal.      Breath sounds: Normal breath sounds.   Musculoskeletal:         General: Normal range of motion.      Cervical back: Normal range of motion.   Neurological:      Mental Status: He is alert and oriented to person, place, and time.   Psychiatric:         Mood and Affect: Mood normal.         Behavior: Behavior normal.         Thought Content: Thought content normal.         Judgment: Judgment normal.       Pertinent labs:  No results found for: \"PR1\", \"PSA\", \"TESTOST\", \"CMPLASABS\"    Dx:  Problem List Items Addressed This Visit       Malignant neoplasm of prostate (Multi) - Primary    Relevant Orders    Prostate Specific Antigen    Testosterone    Prostate Specific Antigen    " Testosterone    Clinic Appointment Request Follow Up; LEANDRO BROOKS; [facility] [address] [facility]     Other Visit Diagnoses         Weak urinary stream        Relevant Medications    tamsulosin (Flomax) 0.4 mg 24 hr capsule        Due for labs today will call with results. Review of latent SE including rectal bleeding, hematuria, urinary strictures, ED where reviewed as well as how to contact office if s/s present. Denies latent SE. NCCN guidelines where reviewed and routine FUV of every 3m for first year and every 6m for four years for a total of five years was discussed. Patient verbalized understanding.     Hormonal therapy:  Recommend vitamin D supplementation, start (or increase by) 400-1000 units daily. Reviewed importance of intake while on Testerone lowering therapy as well as after until Testerone re-establishes, at which point may stop if DEXA indicative of normal bone density.   Reviewed national guidelines for calcium intake of 1000 mg a day whether by diet or supplementation. Reviewed importance of intake while on Testerone lowering therapy as well as after until Testerone re-establishes, at which point may stop if DEXA indicative of normal bone density. Also reviewed that individuals at a higher risk such as those over the age of 75 or those with a hx of bone fx, osteoporosis or osteopenia to continue supplementation indefinitely with routine DEXA imaging as per national guidelines to assess bone health continually. Discussed guidelines for 150 minutes of moderate exercise a week or 75 minutes of Vigorous exercise to help control and reduce weight gain.    PLAN:  FUV 3m  Labs psa and testosterone today  Imaging none  ADT plan for 18-24m last injection 10/2026  Calcium 1000mg and vitd 800u daily  FUV other providers: PCP for routine    Please contact office with any concerns:  Leandro Mendez [facility]  [phone]       [1]   Past Medical History:  Diagnosis Date    Chronic kidney disease     stage 3     Diverticulosis     Gout     Hypertension     Prostate cancer (Multi)    [2]   Family History  Problem Relation Name Age of Onset    Prostate cancer Father     [3]   Past Surgical History:  Procedure Laterality Date    CHOLECYSTECTOMY  05/29/2024    Subtotal barbara, umbilical hernia repair

## 2025-06-17 ENCOUNTER — HOSPITAL ENCOUNTER (OUTPATIENT)
Dept: RADIATION ONCOLOGY | Facility: HOSPITAL | Age: 65
Setting detail: RADIATION/ONCOLOGY SERIES
Discharge: HOME | End: 2025-06-17
Payer: MEDICARE

## 2025-06-17 ENCOUNTER — LAB (OUTPATIENT)
Dept: LAB | Facility: HOSPITAL | Age: 65
End: 2025-06-17
Payer: MEDICARE

## 2025-06-17 VITALS
OXYGEN SATURATION: 96 % | BODY MASS INDEX: 35.32 KG/M2 | RESPIRATION RATE: 18 BRPM | HEART RATE: 80 BPM | DIASTOLIC BLOOD PRESSURE: 76 MMHG | TEMPERATURE: 97.2 F | WEIGHT: 232.3 LBS | SYSTOLIC BLOOD PRESSURE: 133 MMHG

## 2025-06-17 DIAGNOSIS — C61 MALIGNANT NEOPLASM OF PROSTATE (MULTI): ICD-10-CM

## 2025-06-17 DIAGNOSIS — R39.12 WEAK URINARY STREAM: ICD-10-CM

## 2025-06-17 DIAGNOSIS — C61 MALIGNANT NEOPLASM OF PROSTATE (MULTI): Primary | ICD-10-CM

## 2025-06-17 LAB
PSA SERPL-MCNC: <0.1 NG/ML
TESTOST SERPL-MCNC: <30 NG/DL (ref 240–1000)

## 2025-06-17 PROCEDURE — 99214 OFFICE O/P EST MOD 30 MIN: CPT

## 2025-06-17 PROCEDURE — 84403 ASSAY OF TOTAL TESTOSTERONE: CPT

## 2025-06-17 PROCEDURE — 84153 ASSAY OF PSA TOTAL: CPT

## 2025-06-17 PROCEDURE — 36415 COLL VENOUS BLD VENIPUNCTURE: CPT

## 2025-06-17 RX ORDER — TAMSULOSIN HYDROCHLORIDE 0.4 MG/1
0.4 CAPSULE ORAL NIGHTLY
Qty: 30 CAPSULE | Refills: 2 | Status: SHIPPED | OUTPATIENT
Start: 2025-06-17 | End: 2025-09-15

## 2025-06-17 ASSESSMENT — COLUMBIA-SUICIDE SEVERITY RATING SCALE - C-SSRS
6. HAVE YOU EVER DONE ANYTHING, STARTED TO DO ANYTHING, OR PREPARED TO DO ANYTHING TO END YOUR LIFE?: NO
1. IN THE PAST MONTH, HAVE YOU WISHED YOU WERE DEAD OR WISHED YOU COULD GO TO SLEEP AND NOT WAKE UP?: NO
2. HAVE YOU ACTUALLY HAD ANY THOUGHTS OF KILLING YOURSELF?: NO

## 2025-06-17 ASSESSMENT — ENCOUNTER SYMPTOMS
HEMATURIA: 0
OCCASIONAL FEELINGS OF UNSTEADINESS: 0
FREQUENCY: 0
CONSTIPATION: 0
COUGH: 0
BACK PAIN: 0
ANAL BLEEDING: 0
DYSURIA: 0
SHORTNESS OF BREATH: 0
PALPITATIONS: 0
BLOOD IN STOOL: 0
DIFFICULTY URINATING: 0
FATIGUE: 0
JOINT SWELLING: 0
LOSS OF SENSATION IN FEET: 0
CHEST TIGHTNESS: 0
DIARRHEA: 0
ALLERGIC/IMMUNOLOGIC NEGATIVE: 1
ARTHRALGIAS: 0
PSYCHIATRIC NEGATIVE: 1
RECTAL PAIN: 0
ABDOMINAL PAIN: 0
DIZZINESS: 0
WEAKNESS: 0
FEVER: 0
DEPRESSION: 0
UNEXPECTED WEIGHT CHANGE: 0

## 2025-06-17 ASSESSMENT — PAIN SCALES - GENERAL: PAINLEVEL_OUTOF10: 3

## 2025-06-17 ASSESSMENT — PATIENT HEALTH QUESTIONNAIRE - PHQ9
1. LITTLE INTEREST OR PLEASURE IN DOING THINGS: NOT AT ALL
SUM OF ALL RESPONSES TO PHQ9 QUESTIONS 1 AND 2: 0
2. FEELING DOWN, DEPRESSED OR HOPELESS: NOT AT ALL

## 2025-06-30 ENCOUNTER — INFUSION (OUTPATIENT)
Dept: HEMATOLOGY/ONCOLOGY | Facility: HOSPITAL | Age: 65
End: 2025-06-30
Payer: MEDICARE

## 2025-06-30 VITALS
HEART RATE: 90 BPM | DIASTOLIC BLOOD PRESSURE: 64 MMHG | RESPIRATION RATE: 18 BRPM | BODY MASS INDEX: 35.66 KG/M2 | OXYGEN SATURATION: 96 % | TEMPERATURE: 97.5 F | SYSTOLIC BLOOD PRESSURE: 143 MMHG | WEIGHT: 234.5 LBS

## 2025-06-30 DIAGNOSIS — C61 MALIGNANT NEOPLASM OF PROSTATE (MULTI): ICD-10-CM

## 2025-06-30 PROCEDURE — 2500000004 HC RX 250 GENERAL PHARMACY W/ HCPCS (ALT 636 FOR OP/ED): Mod: JZ,TB

## 2025-06-30 PROCEDURE — 96402 CHEMO HORMON ANTINEOPL SQ/IM: CPT

## 2025-06-30 RX ORDER — ALBUTEROL SULFATE 0.83 MG/ML
3 SOLUTION RESPIRATORY (INHALATION) AS NEEDED
Status: DISCONTINUED | OUTPATIENT
Start: 2025-06-30 | End: 2025-06-30 | Stop reason: HOSPADM

## 2025-06-30 RX ORDER — FAMOTIDINE 10 MG/ML
20 INJECTION, SOLUTION INTRAVENOUS ONCE AS NEEDED
Status: DISCONTINUED | OUTPATIENT
Start: 2025-06-30 | End: 2025-06-30 | Stop reason: HOSPADM

## 2025-06-30 RX ORDER — EPINEPHRINE 0.3 MG/.3ML
0.3 INJECTION SUBCUTANEOUS EVERY 5 MIN PRN
Status: DISCONTINUED | OUTPATIENT
Start: 2025-06-30 | End: 2025-06-30 | Stop reason: HOSPADM

## 2025-06-30 RX ORDER — EPINEPHRINE 0.3 MG/.3ML
0.3 INJECTION SUBCUTANEOUS EVERY 5 MIN PRN
OUTPATIENT
Start: 2025-09-22

## 2025-06-30 RX ORDER — DIPHENHYDRAMINE HYDROCHLORIDE 50 MG/ML
50 INJECTION, SOLUTION INTRAMUSCULAR; INTRAVENOUS AS NEEDED
Status: DISCONTINUED | OUTPATIENT
Start: 2025-06-30 | End: 2025-06-30 | Stop reason: HOSPADM

## 2025-06-30 RX ORDER — ALBUTEROL SULFATE 0.83 MG/ML
3 SOLUTION RESPIRATORY (INHALATION) AS NEEDED
OUTPATIENT
Start: 2025-09-22

## 2025-06-30 RX ORDER — FAMOTIDINE 10 MG/ML
20 INJECTION, SOLUTION INTRAVENOUS ONCE AS NEEDED
OUTPATIENT
Start: 2025-09-22

## 2025-06-30 RX ORDER — DIPHENHYDRAMINE HYDROCHLORIDE 50 MG/ML
50 INJECTION, SOLUTION INTRAMUSCULAR; INTRAVENOUS AS NEEDED
OUTPATIENT
Start: 2025-09-22

## 2025-06-30 RX ADMIN — LEUPROLIDE ACETATE 22.5 MG: KIT at 15:49

## 2025-06-30 NOTE — PROGRESS NOTES
Patient tolerated the treatment very well. Discussed and reviewed upcoming schedules with patient. All queries and concerns addressed. Discharged to home in stable condition.

## (undated) DEVICE — MARKER, SKIN, DUAL TIP INK W/9 LABEL AND REMOVABLE TIME OUT SLEEVE

## (undated) DEVICE — SYRINGE, 20 CC, LUER LOCK, MONOJECT, W/O CAP, LF

## (undated) DEVICE — SPINAL NEEDLE, 22G, 6"

## (undated) DEVICE — PROBE COVER, ULTRASOUND 8818

## (undated) DEVICE — DRESSING, GAUZE, 16 PLY, 4 X 4 IN, STERILE

## (undated) DEVICE — NEEDLE, SPINAL, QUINCKE, LUER LOCK, 18 G X 6 IN

## (undated) DEVICE — CONTAINER, SPECIMEN, 120 ML, STERILE

## (undated) DEVICE — DRAPE, SHEET, THREE QUARTER, FAN FOLD, 57 X 77 IN

## (undated) DEVICE — SYSTEM, SPACEOAR VUE, HYDROGEL